# Patient Record
Sex: FEMALE | RURAL
[De-identification: names, ages, dates, MRNs, and addresses within clinical notes are randomized per-mention and may not be internally consistent; named-entity substitution may affect disease eponyms.]

---

## 2022-08-19 ENCOUNTER — OUTSIDE PLACE OF SERVICE (OUTPATIENT)
Dept: OBSTETRICS AND GYNECOLOGY | Facility: CLINIC | Age: 32
End: 2022-08-19

## 2023-08-12 ENCOUNTER — HOSPITAL ENCOUNTER (EMERGENCY)
Facility: HOSPITAL | Age: 33
Discharge: HOME | End: 2023-08-12
Payer: MEDICAID

## 2023-08-12 VITALS
OXYGEN SATURATION: 97 % | TEMPERATURE: 98 F | RESPIRATION RATE: 18 BRPM | HEART RATE: 103 BPM | HEIGHT: 67 IN | WEIGHT: 181 LBS | BODY MASS INDEX: 28.41 KG/M2 | SYSTOLIC BLOOD PRESSURE: 122 MMHG | DIASTOLIC BLOOD PRESSURE: 75 MMHG

## 2023-08-12 DIAGNOSIS — O46.8X1 SUBCHORIONIC HEMORRHAGE OF PLACENTA IN FIRST TRIMESTER, SINGLE OR UNSPECIFIED FETUS: Primary | ICD-10-CM

## 2023-08-12 DIAGNOSIS — O20.9 VAGINAL BLEEDING IN PREGNANCY, FIRST TRIMESTER: ICD-10-CM

## 2023-08-12 DIAGNOSIS — O46.92 VAGINAL BLEEDING IN PREGNANCY, SECOND TRIMESTER: ICD-10-CM

## 2023-08-12 DIAGNOSIS — O41.8X10 SUBCHORIONIC HEMORRHAGE OF PLACENTA IN FIRST TRIMESTER, SINGLE OR UNSPECIFIED FETUS: Primary | ICD-10-CM

## 2023-08-12 LAB
ALBUMIN SERPL BCP-MCNC: 3.4 G/DL (ref 3.5–5)
ALBUMIN/GLOB SERPL: 0.9 {RATIO}
ALP SERPL-CCNC: 62 U/L (ref 37–98)
ALT SERPL W P-5'-P-CCNC: 16 U/L (ref 13–56)
ANION GAP SERPL CALCULATED.3IONS-SCNC: 6 MMOL/L (ref 7–16)
AST SERPL W P-5'-P-CCNC: 13 U/L (ref 15–37)
B-HCG UR QL: POSITIVE
BACTERIA #/AREA URNS HPF: ABNORMAL /HPF
BASOPHILS # BLD AUTO: 0.05 K/UL (ref 0–0.2)
BASOPHILS NFR BLD AUTO: 0.6 % (ref 0–1)
BILIRUB SERPL-MCNC: 1 MG/DL (ref ?–1.2)
BILIRUB UR QL STRIP: NEGATIVE
BUN SERPL-MCNC: 15 MG/DL (ref 7–18)
BUN/CREAT SERPL: 19 (ref 6–20)
CALCIUM SERPL-MCNC: 8.6 MG/DL (ref 8.5–10.1)
CHLORIDE SERPL-SCNC: 109 MMOL/L (ref 98–107)
CLARITY UR: ABNORMAL
CO2 SERPL-SCNC: 26 MMOL/L (ref 21–32)
COLOR UR: COLORLESS
CREAT SERPL-MCNC: 0.79 MG/DL (ref 0.55–1.02)
CTP QC/QA: YES
DIFFERENTIAL METHOD BLD: ABNORMAL
EGFR (NO RACE VARIABLE) (RUSH/TITUS): 101 ML/MIN/1.73M2
EOSINOPHIL # BLD AUTO: 0.04 K/UL (ref 0–0.5)
EOSINOPHIL NFR BLD AUTO: 0.5 % (ref 1–4)
ERYTHROCYTE [DISTWIDTH] IN BLOOD BY AUTOMATED COUNT: 17.2 % (ref 11.5–14.5)
GLOBULIN SER-MCNC: 3.8 G/DL (ref 2–4)
GLUCOSE SERPL-MCNC: 109 MG/DL (ref 74–106)
GLUCOSE UR STRIP-MCNC: NORMAL MG/DL
HCG SERPL-ACNC: NORMAL MIU/ML
HCT VFR BLD AUTO: 34.6 % (ref 38–47)
HGB BLD-MCNC: 11.7 G/DL (ref 12–16)
IMM GRANULOCYTES # BLD AUTO: 0.05 K/UL (ref 0–0.04)
IMM GRANULOCYTES NFR BLD: 0.6 % (ref 0–0.4)
INDIRECT COOMBS: NORMAL
INR BLD: 1.06
KETONES UR STRIP-SCNC: NEGATIVE MG/DL
LEUKOCYTE ESTERASE UR QL STRIP: ABNORMAL
LYMPHOCYTES # BLD AUTO: 1.79 K/UL (ref 1–4.8)
LYMPHOCYTES NFR BLD AUTO: 20.8 % (ref 27–41)
MCH RBC QN AUTO: 27.6 PG (ref 27–31)
MCHC RBC AUTO-ENTMCNC: 33.8 G/DL (ref 32–36)
MCV RBC AUTO: 81.6 FL (ref 80–96)
MONOCYTES # BLD AUTO: 0.51 K/UL (ref 0–0.8)
MONOCYTES NFR BLD AUTO: 5.9 % (ref 2–6)
MPC BLD CALC-MCNC: 9.4 FL (ref 9.4–12.4)
MUCOUS, UA: ABNORMAL /LPF
NEUTROPHILS # BLD AUTO: 6.15 K/UL (ref 1.8–7.7)
NEUTROPHILS NFR BLD AUTO: 71.6 % (ref 53–65)
NITRITE UR QL STRIP: NEGATIVE
NRBC # BLD AUTO: 0 X10E3/UL
NRBC, AUTO (.00): 0 %
PH UR STRIP: 6 PH UNITS
PLATELET # BLD AUTO: 326 K/UL (ref 150–400)
POTASSIUM SERPL-SCNC: 3.3 MMOL/L (ref 3.5–5.1)
PROT SERPL-MCNC: 7.2 G/DL (ref 6.4–8.2)
PROT UR QL STRIP: NEGATIVE
PROTHROMBIN TIME: 13.7 SECONDS (ref 11.7–14.7)
RBC # BLD AUTO: 4.24 M/UL (ref 4.2–5.4)
RBC # UR STRIP: ABNORMAL /UL
RBC #/AREA URNS HPF: 1 /HPF
RH BLD: NORMAL
SODIUM SERPL-SCNC: 138 MMOL/L (ref 136–145)
SP GR UR STRIP: 1
SPECIMEN OUTDATE: NORMAL
SQUAMOUS #/AREA URNS LPF: ABNORMAL /HPF
UROBILINOGEN UR STRIP-ACNC: NORMAL MG/DL
WBC # BLD AUTO: 8.59 K/UL (ref 4.5–11)
WBC #/AREA URNS HPF: 4 /HPF

## 2023-08-12 PROCEDURE — 85025 COMPLETE CBC W/AUTO DIFF WBC: CPT | Performed by: NURSE PRACTITIONER

## 2023-08-12 PROCEDURE — 85610 PROTHROMBIN TIME: CPT | Performed by: NURSE PRACTITIONER

## 2023-08-12 PROCEDURE — 81025 URINE PREGNANCY TEST: CPT | Performed by: NURSE PRACTITIONER

## 2023-08-12 PROCEDURE — 86900 BLOOD TYPING SEROLOGIC ABO: CPT | Performed by: NURSE PRACTITIONER

## 2023-08-12 PROCEDURE — 99284 PR EMERGENCY DEPT VISIT,LEVEL IV: ICD-10-PCS | Mod: ,,, | Performed by: NURSE PRACTITIONER

## 2023-08-12 PROCEDURE — 80053 COMPREHEN METABOLIC PANEL: CPT | Performed by: NURSE PRACTITIONER

## 2023-08-12 PROCEDURE — 84702 CHORIONIC GONADOTROPIN TEST: CPT | Performed by: NURSE PRACTITIONER

## 2023-08-12 PROCEDURE — 81001 URINALYSIS AUTO W/SCOPE: CPT | Performed by: NURSE PRACTITIONER

## 2023-08-12 PROCEDURE — 99284 EMERGENCY DEPT VISIT MOD MDM: CPT | Mod: 25

## 2023-08-12 PROCEDURE — 99284 EMERGENCY DEPT VISIT MOD MDM: CPT | Mod: ,,, | Performed by: NURSE PRACTITIONER

## 2023-08-13 NOTE — ED PROVIDER NOTES
Encounter Date: 2023       History     Chief Complaint   Patient presents with    Vaginal Bleeding     Pt states she is not sure if she started her menses - pt states she was getting out of shower and noticed blood - pt states she has saturated 3 pads - pt states her last menses was may 15     33 y/o WF presents to the emergency department with c/o vaginal bleeding that started when she was getting out of the shower. She reports LMP was in May but she states that irregular periods was not unusual for her. Per patient reported history she is a , all vaginally deliveries, full term without complications. She denies dysuria or hematuria. She denies fevers or chills.She does report some associated abd cramping. Hx of 1 miscarriage in 1st trimester. She is sexually active,1 partner. She reports last BM was today and was normal. There are no known exacerbating or remitting factors.     The history is provided by the patient.     Review of patient's allergies indicates:  No Known Allergies  History reviewed. No pertinent past medical history.  History reviewed. No pertinent surgical history.  History reviewed. No pertinent family history.  Social History     Tobacco Use    Smoking status: Every Day     Current packs/day: 0.00     Types: Cigarettes    Smokeless tobacco: Never   Substance Use Topics    Alcohol use: Not Currently    Drug use: Never     Review of Systems   All other systems reviewed and are negative.      Physical Exam     Initial Vitals [233]   BP Pulse Resp Temp SpO2   111/63 95 18 98.1 °F (36.7 °C) 97 %      MAP       --         Physical Exam    Constitutional: She appears well-developed and well-nourished. She is cooperative.   Cardiovascular:  Normal rate, regular rhythm, normal heart sounds and normal pulses.           Pulmonary/Chest: Effort normal and breath sounds normal.   Abdominal: Abdomen is soft. Bowel sounds are normal. There is no abdominal tenderness.   Genitourinary: Uterus  is enlarged. Cervix exhibits no motion tenderness. Right adnexum displays no tenderness. Left adnexum displays no tenderness.    Vaginal bleeding present.   There is bleeding in the vagina.    Genitourinary Comments: Cervix long/thick; <1cm dilated  KRISTIE Flores RN at bedside during examination       Neurological: She is alert and oriented to person, place, and time.   Skin: Skin is warm, dry and intact. Capillary refill takes less than 2 seconds.   Psychiatric: She has a normal mood and affect. Her speech is normal and behavior is normal. Judgment and thought content normal. Cognition and memory are normal.         Medical Screening Exam   See Full Note    ED Course   Procedures  Labs Reviewed   COMPREHENSIVE METABOLIC PANEL - Abnormal; Notable for the following components:       Result Value    Potassium 3.3 (*)     Chloride 109 (*)     Anion Gap 6 (*)     Glucose 109 (*)     Albumin 3.4 (*)     AST 13 (*)     All other components within normal limits   URINALYSIS, REFLEX TO URINE CULTURE - Abnormal; Notable for the following components:    Leukocytes, UA Moderate (*)     Blood, UA Large (*)     All other components within normal limits   CBC WITH DIFFERENTIAL - Abnormal; Notable for the following components:    Hemoglobin 11.7 (*)     Hematocrit 34.6 (*)     RDW 17.2 (*)     Neutrophils % 71.6 (*)     Lymphocytes % 20.8 (*)     Eosinophils % 0.5 (*)     Immature Granulocytes % 0.6 (*)     Immature Granulocytes, Absolute 0.05 (*)     All other components within normal limits   URINALYSIS, MICROSCOPIC - Abnormal; Notable for the following components:    Bacteria, UA Few (*)     Squamous Epithelial Cells, UA Occasional (*)     Mucous Occasional (*)     All other components within normal limits   POCT URINE PREGNANCY - Abnormal; Notable for the following components:    POC Preg Test, Ur Positive (*)     All other components within normal limits   PROTIME-INR - Normal   CBC W/ AUTO DIFFERENTIAL    Narrative:      The following orders were created for panel order CBC auto differential.  Procedure                               Abnormality         Status                     ---------                               -----------         ------                     CBC with Differential[[id number]]        Abnormal            Final result                 Please view results for these tests on the individual orders.   HCG, TOTAL, QUANTITATIVE   TYPE & SCREEN          Imaging Results              US OB <14 Wks TransAbd & TransVag, Single Gestation (XPD) (In process)  Result time 23 22:40:47   Procedure changed from US OB 14+ Wks, TransAbd, Single Gestation                    Medications - No data to display  Medical Decision Making:   Initial Assessment:   33 y/o WF presents to the emergency department with c/o vaginal bleeding that started when she was getting out of the shower. She reports LMP was in May but she states that irregular periods was not unusual for her. Per patient reported history she is a , all vaginally deliveries, full term without complications. She denies dysuria or hematuria. She denies fevers or chills.She does report some associated abd cramping. Hx of 1 miscarriage in 1st trimester. She is sexually active,1 partner. She reports last BM was today and was normal. There are no known exacerbating or remitting factors.     The history is provided by the patient.     Differential Diagnosis:   Dysfunctional uterine bleeding   Pregnancy   Threatened   Subchorionic hemorrhage  Vs other  Clinical Tests:   Lab Tests: Ordered  Radiological Study: Ordered  ED Management:  After patient's UPT returned positive, pelvic exam done with nurse at bedside. Unable to test for amniotic fluid d/t bleeding. This makes her . US ordered for further evaluation. She denies having felt fetal movement. Verbal report for US with 9 week 1 day gestation with heart rate of 174; there is surrounding subchorionic hemorrhage.  Counseled on supportive measures/pregnancy precautions. Strict f/u instructions given. Warning s/s discussed and return precautions given; the patient has v/u.                           Clinical Impression:   Final diagnoses:  [O46.92] Vaginal bleeding in pregnancy, second trimester  [O41.8X10, O46.8X1] Subchorionic hemorrhage of placenta in first trimester, single or unspecified fetus (Primary)  [O20.9] Vaginal bleeding in pregnancy, first trimester        ED Disposition Condition    Discharge Stable          ED Prescriptions       Medication Sig Dispense Start Date End Date Auth. Provider    prenat.vits,lula,min-iron-folic Tab Take 1 tablet by mouth once daily. 30 each 8/12/2023 9/11/2023 Ayleen Dumont FNP          Follow-up Information       Follow up With Specialties Details Why Contact Info    Fritz Whitley, DO Psychiatry  As needed [address] [place] [id number]  [phone]      OB/GYN  Schedule an appointment as soon as possible for a visit                Ayleen Dumont FNP  08/12/23 5465

## 2023-08-13 NOTE — DISCHARGE INSTRUCTIONS
Pelvic rest/No sex. Call your OB/GYN first thing Monday morning and make an appointment to be seen; you will need your labs rechecked. Take prenatal vitamins as directed. Tylenol for pain. Follow up with your primary care provider as needed. Return to the ED for worsening signs and symptoms or otherwise as needed.

## 2024-02-12 ENCOUNTER — HOSPITAL ENCOUNTER (EMERGENCY)
Facility: HOSPITAL | Age: 34
Discharge: HOME | End: 2024-02-12
Attending: OBSTETRICS & GYNECOLOGY
Payer: MEDICAID

## 2024-02-12 VITALS
HEART RATE: 71 BPM | RESPIRATION RATE: 20 BRPM | DIASTOLIC BLOOD PRESSURE: 56 MMHG | TEMPERATURE: 99 F | HEIGHT: 67 IN | WEIGHT: 193 LBS | SYSTOLIC BLOOD PRESSURE: 118 MMHG | BODY MASS INDEX: 30.29 KG/M2

## 2024-02-12 DIAGNOSIS — Z3A.37 37 WEEKS GESTATION OF PREGNANCY: ICD-10-CM

## 2024-02-12 DIAGNOSIS — Z03.71 NO LEAKAGE OF AMNIOTIC FLUID INTO VAGINA: ICD-10-CM

## 2024-02-12 DIAGNOSIS — O47.1 FALSE LABOR AFTER 37 WEEKS OF GESTATION WITHOUT DELIVERY: Primary | ICD-10-CM

## 2024-02-12 DIAGNOSIS — O47.9 UTERINE CONTRACTIONS DURING PREGNANCY: ICD-10-CM

## 2024-02-12 DIAGNOSIS — O09.523 ADVANCED MATERNAL AGE IN MULTIGRAVIDA, THIRD TRIMESTER: ICD-10-CM

## 2024-02-12 LAB
BACTERIA #/AREA URNS HPF: ABNORMAL /HPF
BILIRUB UR QL STRIP: NEGATIVE
CLARITY UR: ABNORMAL
COLOR UR: ABNORMAL
CTP QC/QA: YES
GLUCOSE UR STRIP-MCNC: NORMAL MG/DL
KETONES UR STRIP-SCNC: NEGATIVE MG/DL
LEUKOCYTE ESTERASE UR QL STRIP: ABNORMAL
MUCOUS, UA: ABNORMAL /LPF
NITRITE UR QL STRIP: NEGATIVE
PH UR STRIP: 7 PH UNITS
PROT UR QL STRIP: NEGATIVE
RBC # UR STRIP: NEGATIVE /UL
RBC #/AREA URNS HPF: 2 /HPF
RUPTURE OF MEMBRANE: NEGATIVE
SP GR UR STRIP: 1.01
SQUAMOUS #/AREA URNS LPF: ABNORMAL /HPF
UROBILINOGEN UR STRIP-ACNC: NORMAL MG/DL
WBC #/AREA URNS HPF: 3 /HPF
YEAST #/AREA URNS HPF: ABNORMAL /HPF

## 2024-02-12 PROCEDURE — 81003 URINALYSIS AUTO W/O SCOPE: CPT | Performed by: OBSTETRICS & GYNECOLOGY

## 2024-02-12 PROCEDURE — 59025 FETAL NON-STRESS TEST: CPT

## 2024-02-12 PROCEDURE — 87086 URINE CULTURE/COLONY COUNT: CPT | Performed by: OBSTETRICS & GYNECOLOGY

## 2024-02-12 PROCEDURE — 84112 EVAL AMNIOTIC FLUID PROTEIN: CPT

## 2024-02-12 PROCEDURE — 99284 EMERGENCY DEPT VISIT MOD MDM: CPT

## 2024-02-12 NOTE — ED NOTES
Discharge instructions reviewed and copy given. Instructed on fetal kick counts and s/s of labor. Voiced understanding. Discharge per ambulatory in stable condition.

## 2024-02-12 NOTE — ED NOTES
Dr. Deng at bedside. Discharged with discharge instructions given. To keep scheduled appointment with Dr. Bridges on Thursday. Voiced understanding. Questions answered.

## 2024-02-12 NOTE — ED PROVIDER NOTES
Encounter Date: 2024    [facility] Physician: Elton Deng   Primary OBGYN: Dr. Almeida    Admit Diagnosis/Chief Complaint: Leakage of Fluid and Contractions  History     Chief Complaint   Patient presents with    Contractions    questionable leaking membranes     Patient is a 33-year-old  7 para 5 AB1 who presents at 37 weeks and 6 days with complaints of leakage of fluid for the past 2 days and uterine contractions for the same period of time.  She reports active fetal movement and denies vaginal bleeding.  She receives prenatal care with Dr. Almeida and reports that she has an appointment with him this Thursday on 2024.  No records are available.        Obstetric HPI:  Patient reports occasional irregular contractions, active fetal movement, absent vaginal bleeding , possible loss of fluid for  the past 2 days     Objective:     Vital Signs (Most Recent):  Temp: 98.7 °F (37.1 °C) (24 0751)  Pulse: 71 (24 0751)  Resp: 20 (24 0751)  BP: (!) 118/56 (24 0751) Vital Signs (24h Range):  Temp:  [98.7 °F (37.1 °C)] 98.7 °F (37.1 °C)  Pulse:  [71] 71  Resp:  [20] 20  BP: (118)/(56) 118/56     Weight: 87.5 kg (193 lb)  Body mass index is 30.23 kg/m².    FHT: 130  Cat 1 (reassuring)  TOCO:  Occasional contraction noted    No intake or output data in the 24 hours ending 24 1619    Cervical Exam:  Per Nurse x 2  Dilation:  2  Effacement:  50%  Station: -2  Presentation: Vertex     Significant Labs:  Recent Lab Results         24  1019   24  0858        Rupture of Membrane Negative         Appearance, UA   Turbid       Bacteria, UA   Many       Bilirubin (UA)   Negative       Color, UA   Light-Yellow       Glucose, UA   Normal       Ketones, UA   Negative       Leukocyte Esterase, UA   Large       Mucous   Occasional       NITRITE UA   Negative       Blood, UA   Negative       pH, UA   7.0       Protein, UA   Negative        Acceptable Yes         RBC,  UA   2       Specific [place], UA   1.010       Squamous Epithelial Cells, UA   Occasional       UROBILINOGEN UA   Normal       WBC, UA   3       Yeast, UA   Occasional             Review of patient's allergies indicates:  No Known Allergies  History reviewed. No pertinent past medical history.  History reviewed. No pertinent surgical history.  History reviewed. No pertinent family history.  Social History     Tobacco Use    Smoking status: Every Day     Types: Cigarettes    Smokeless tobacco: Never   Substance Use Topics    Alcohol use: Not Currently    Drug use: Never     Review of Systems   Constitutional:  Negative for appetite change, chills, fatigue and fever.   HENT:  Negative for congestion.    Eyes:  Negative for visual disturbance.   Respiratory:  Negative for cough, chest tightness and shortness of breath.    Cardiovascular:  Negative for chest pain, palpitations and leg swelling.   Gastrointestinal:  Negative for abdominal distention, abdominal pain, blood in stool, constipation, diarrhea, nausea and vomiting.   Endocrine: Negative for cold intolerance, heat intolerance, polydipsia, polyphagia and polyuria.   Genitourinary:  Positive for vaginal discharge. Negative for difficulty urinating, dyspareunia, dysuria, flank pain, frequency, pelvic pain, urgency, vaginal bleeding and vaginal pain.   Skin: Negative.    Allergic/Immunologic: Negative.    Neurological:  Negative for headaches.   Hematological: Negative.    Psychiatric/Behavioral: Negative.  Negative for agitation, behavioral problems and confusion.        Physical Exam     Initial Vitals [02/12/24 0751]   BP Pulse Resp Temp SpO2   (!) 118/56 71 20 98.7 °F (37.1 °C) --      MAP       --         Physical Exam    Nursing note and vitals reviewed.  Constitutional: She appears well-developed and well-nourished. No distress.   HENT:   Head: Normocephalic and atraumatic.   Nose: Nose normal.   Eyes: EOM are normal. Pupils are equal, round, and reactive  to light.   Neck:   Normal range of motion.  Cardiovascular:  Normal rate.           Pulmonary/Chest: No respiratory distress.   Abdominal: Abdomen is soft. There is no abdominal tenderness.   Genitourinary:    Vagina and uterus normal.      Genitourinary Comments: ROM+ Neg     Musculoskeletal:         General: No edema.      Cervical back: Normal range of motion.     Neurological: She is alert and oriented to person, place, and time.   Skin: Skin is warm and dry.   Psychiatric: She has a normal mood and affect. Thought content normal.         ED Course     Labs Reviewed   URINALYSIS, REFLEX TO URINE CULTURE - Abnormal; Notable for the following components:       Result Value    Leukocytes, UA Large (*)     All other components within normal limits   URINALYSIS, MICROSCOPIC - Abnormal; Notable for the following components:    Bacteria, UA Many (*)     Squamous Epithelial Cells, UA Occasional (*)     Yeast, UA Occasional (*)     Mucous Occasional (*)     All other components within normal limits   CULTURE, URINE   POCT RUPTURE OF MEMBRANE        Imaging Results    None          Medical Decision Making  Patient presents at 37 weeks and 6 days with complaints of possible leakage of amniotic fluid and contractions for past 2 days.  She reports active fetal movement and denies any vaginal bleeding.  Sterile speculum examination was performed a ROM plus was negative there has no vaginal pooling noted.  Cervical exam was 2 cm 50% -3.  Urinalysis was negative for ketones nitrites bili blood and bilirubin.  Nonstress test was reactive category 1 with no uterine contractions noted.  Cervical exam was 2 cm 50% -3 which was unchanged after prolonged observation.  Patient will follow-up with Dr. Quintanilla on 2/15/2024    Amount and/or Complexity of Data Reviewed  Labs: ordered.       Medications - No data to display              ED Course as of 24 1619   Mon 2024   0856 33-year-old  AB1 who presents at 37 weeks  and 6 days by patient's report of EDC with complaints of leakage of amniotic fluid for 2 days and contractions for 2 days.  Patient receives prenatal care with Dr. Mary amaya but chose to come to [facility] ED instead.  She denies any vaginal bleeding headaches blurred vision nausea vomiting changes in bowel habits or decrease in fetal movement.  She does report a slight increase in discharge over the past 2 days as well as irregular contractions over the past 2 days as well.  She expressed concern that she may miss having an epidural if she waited too long for labor to start.  She has an appointment with Dr. Almeida on Thursday 02/15/2024 at which time they were going to discuss induction of labor.  Nonstress test is reactive with a baseline of 130 and no uterine contractions noted.  ROM plus was negative.  Cervical exam by me was 2 cm 50% -3 which is unchanged from the patient's report of her last check with Dr. Almeida. [JA]   1556 Urinalysis was negative for ketones nitrites bilirubin blood and protein.  ROM plus was negative.  Patient is being discharged home in stable condition and will follow-up with Dr. Almeida and 1 week. [JA]      ED Course User Index  [JA] Elton Deng MD                 Clinical Impression:   Final diagnoses:  [O47.9] Uterine contractions during pregnancy  [O47.1] False labor after 37 weeks of gestation without delivery (Primary)  [Z3A.37] 37 weeks gestation of pregnancy  [Z03.71] No leakage of amniotic fluid into vagina  [O09.523] Advanced maternal age in multigravida, third trimester        ED Disposition Condition    Discharge Stable          ED Prescriptions    None       Follow-up Information       Follow up With Specialties Details Why Contact Info    Oswaldo Almeida MD Obstetrics and Gynecology On 2/15/2024  [address] [facility] [address]  [phone]               Elotn Deng MD  02/12/24 5964

## 2024-02-12 NOTE — ED NOTES
Refer to [facility] with complaints of questionable leaking membranes per ambulance. Stable condition. Connected to EFM. Plan of care discussed with positive feedback. Oriented to room. CCUA obtained.

## 2024-02-14 LAB — UA COMPLETE W REFLEX CULTURE PNL UR: ABNORMAL

## 2024-02-17 RX ORDER — AMOXICILLIN AND CLAVULANATE POTASSIUM 875; 125 MG/1; MG/1
1 TABLET, FILM COATED ORAL EVERY 12 HOURS
Qty: 20 TABLET | Refills: 0 | Status: SHIPPED | OUTPATIENT
Start: 2024-02-17 | End: 2024-02-27

## 2024-05-13 PROBLEM — Z03.71 NO LEAKAGE OF AMNIOTIC FLUID INTO VAGINA: Status: RESOLVED | Noted: 2024-02-12 | Resolved: 2024-05-13

## 2025-01-20 PROBLEM — Z3A.37 37 WEEKS GESTATION OF PREGNANCY: Status: RESOLVED | Noted: 2024-02-12 | Resolved: 2025-01-20

## 2025-05-09 ENCOUNTER — TELEPHONE (OUTPATIENT)
Dept: FAMILY MEDICINE | Facility: CLINIC | Age: 35
End: 2025-05-09

## 2025-05-09 NOTE — LETTER
May 9, 2025    Donna Adair  [address]  [place] [address]             [facility] [address]  [place] [address]  Phone: [phone]  Fax: [phone] Dear Ms. Donna Adair:    We are sorry that you missed your appointment with 05/06/2025 on 5/9/2025. Your health and follow-up medical care are important to us. Please call our office as soon as possible so that we may reschedule your appointment. If you have already rescheduled your appointment, please disregard this letter.    Sincerely,        ÁLVARO Golden

## 2025-08-30 ENCOUNTER — HOSPITAL ENCOUNTER (EMERGENCY)
Facility: HOSPITAL | Age: 35
Discharge: LEFT BEFORE DISCHARGE | End: 2025-08-30

## 2025-08-30 PROCEDURE — 99900041 HC LEFT WITHOUT BEING SEEN- EMERGENCY

## 2025-08-30 PROCEDURE — 99499 UNLISTED E&M SERVICE: CPT | Mod: ,,, | Performed by: NURSE PRACTITIONER
